# Patient Record
Sex: MALE | Race: WHITE | NOT HISPANIC OR LATINO | Employment: UNEMPLOYED | URBAN - METROPOLITAN AREA
[De-identification: names, ages, dates, MRNs, and addresses within clinical notes are randomized per-mention and may not be internally consistent; named-entity substitution may affect disease eponyms.]

---

## 2020-12-02 ENCOUNTER — HOSPITAL ENCOUNTER (OUTPATIENT)
Dept: TELEMEDICINE | Facility: HOSPITAL | Age: 30
Discharge: HOME | End: 2020-12-02
Payer: MEDICAID

## 2020-12-02 NOTE — CONSULTS
"[facility]  Psychiatry  Telepsychiatry Consult Note    Please see previous notes:    Patient agreeable to consultation via telepsychiatry.    Tele-Consultation from Psychiatry started: 12/2/2020 at 1600  The chief complaint leading to psychiatric consultation is: SI  This consultation was requested by Dr. Wang Marques, the Emergency Department attending physician.  The location of the consulting psychiatrist is [address] [place] [address].  The patient location is [facility]LE[facility]     Patient Identification:   Chidi Smith is a 30 y.o. male.    Patient information was obtained from nursing staff.  Patient presented voluntarily to the Emergency Department ambulatory.    Consults  Subjective:     History of Present Illness:    Pt was sedated during time of consult from PRN for agitation.  Hx provided by nursing staff.  Pt came to ED with complaint of SI without plan. Pt had recently left another facility.  Pt's mother arrived at ED and pt became agitated: throwing things and yelling "just kill me know".  Pt given PRN and currently resting in bed.     Psychiatric History: unable to obtain at this time    Substance Abuse History: unable to obtain at this time    Legal History: Past charges/incarcerations: unknown    Family Psychiatric History: unkown    Psychiatric Mental Status Exam: sedated    Past Medical History:   Past Medical History:   Diagnosis Date    ADD (attention deficit disorder)     Depression     PTSD (post-traumatic stress disorder)       Laboratory Data: Labs Reviewed - No data to display    Allergies:   Review of patient's allergies indicates:   Allergen Reactions    Benadryl allergy decongestant Other (See Comments)     Dizziness, diaphoresis       Medications in ER: Medications - No data to display    Medications at home: unkown    No new subjective & objective note has been filed under this hospital service " since the last note was generated.      Assessment - Diagnosis - Goals:     Diagnosis/Impression:   Depression, unspecified type  Suicidal ideations  Hx of alcohol abuse  Hx of stimulant use disorder    Rec:   -- Continue PEC/CEC at this time for imminent danger to self (SI) and begin medical clearance for transition to inpatient psychiatric facility for acute stabilization of symptoms.  Please re consult for any further evaluation of reassessment.     Time with patient: 5 minutes      More than 50% of the time was spent counseling/coordinating care      Consultation ended: 12/2/2020 at 4167    Benton Chatterjee III, NP   Psychiatry  [facility]